# Patient Record
Sex: MALE | Race: WHITE
[De-identification: names, ages, dates, MRNs, and addresses within clinical notes are randomized per-mention and may not be internally consistent; named-entity substitution may affect disease eponyms.]

---

## 2017-03-11 ENCOUNTER — HOSPITAL ENCOUNTER (INPATIENT)
Dept: HOSPITAL 61 - ER | Age: 77
LOS: 2 days | Discharge: HOME | DRG: 392 | End: 2017-03-13
Attending: INTERNAL MEDICINE | Admitting: INTERNAL MEDICINE
Payer: MEDICARE

## 2017-03-11 VITALS — DIASTOLIC BLOOD PRESSURE: 84 MMHG | SYSTOLIC BLOOD PRESSURE: 143 MMHG

## 2017-03-11 VITALS — DIASTOLIC BLOOD PRESSURE: 83 MMHG | SYSTOLIC BLOOD PRESSURE: 134 MMHG

## 2017-03-11 VITALS — BODY MASS INDEX: 27.79 KG/M2 | HEIGHT: 67 IN | WEIGHT: 177.03 LBS

## 2017-03-11 DIAGNOSIS — E78.5: ICD-10-CM

## 2017-03-11 DIAGNOSIS — I12.9: ICD-10-CM

## 2017-03-11 DIAGNOSIS — Z88.5: ICD-10-CM

## 2017-03-11 DIAGNOSIS — E11.21: ICD-10-CM

## 2017-03-11 DIAGNOSIS — K21.9: ICD-10-CM

## 2017-03-11 DIAGNOSIS — Z79.82: ICD-10-CM

## 2017-03-11 DIAGNOSIS — Z82.49: ICD-10-CM

## 2017-03-11 DIAGNOSIS — E03.9: ICD-10-CM

## 2017-03-11 DIAGNOSIS — K57.90: ICD-10-CM

## 2017-03-11 DIAGNOSIS — N18.3: ICD-10-CM

## 2017-03-11 DIAGNOSIS — E11.22: ICD-10-CM

## 2017-03-11 DIAGNOSIS — Z79.84: ICD-10-CM

## 2017-03-11 DIAGNOSIS — Z83.3: ICD-10-CM

## 2017-03-11 DIAGNOSIS — R07.89: ICD-10-CM

## 2017-03-11 DIAGNOSIS — Z82.3: ICD-10-CM

## 2017-03-11 DIAGNOSIS — N40.0: ICD-10-CM

## 2017-03-11 DIAGNOSIS — E78.00: ICD-10-CM

## 2017-03-11 DIAGNOSIS — K22.4: Primary | ICD-10-CM

## 2017-03-11 DIAGNOSIS — Z79.4: ICD-10-CM

## 2017-03-11 DIAGNOSIS — Z97.0: ICD-10-CM

## 2017-03-11 LAB
ALBUMIN SERPL-MCNC: 4.2 G/DL (ref 3.4–5)
ALP SERPL-CCNC: 47 U/L (ref 46–116)
ALT SERPL-CCNC: 31 U/L (ref 16–63)
ANION GAP SERPL CALC-SCNC: 14 MMOL/L (ref 6–14)
AST SERPL-CCNC: 29 U/L (ref 15–37)
BASOPHILS # BLD AUTO: 0.1 X10^3/UL (ref 0–0.2)
BASOPHILS NFR BLD: 1 % (ref 0–3)
BILIRUB DIRECT SERPL-MCNC: 0.1 MG/DL (ref 0–0.2)
BILIRUB SERPL-MCNC: 0.6 MG/DL (ref 0.2–1)
BUN SERPL-MCNC: 9 MG/DL (ref 8–26)
CALCIUM SERPL-MCNC: 9.3 MG/DL (ref 8.5–10.1)
CHLORIDE SERPL-SCNC: 100 MMOL/L (ref 98–107)
CK SERPL-CCNC: 125 U/L (ref 39–308)
CKMB INDEX: 1.3 % (ref 0–4)
CKMB MASS: 1.6 NG/ML (ref 0–3.6)
CO2 SERPL-SCNC: 24 MMOL/L (ref 21–32)
CREAT SERPL-MCNC: 1.3 MG/DL (ref 0.7–1.3)
EOSINOPHIL NFR BLD: 4 % (ref 0–3)
ERYTHROCYTE [DISTWIDTH] IN BLOOD BY AUTOMATED COUNT: 12.9 % (ref 11.5–14.5)
GFR SERPLBLD BASED ON 1.73 SQ M-ARVRAT: 53.8 ML/MIN
GLUCOSE SERPL-MCNC: 140 MG/DL (ref 70–99)
HCT VFR BLD CALC: 47.7 % (ref 39–53)
HGB BLD-MCNC: 16.2 G/DL (ref 13–17.5)
INR PPP: 0.9 (ref 0.8–1.1)
LYMPHOCYTES # BLD: 3 X10^3/UL (ref 1–4.8)
LYMPHOCYTES NFR BLD AUTO: 32 % (ref 24–48)
MAGNESIUM SERPL-MCNC: 1.9 MG/DL (ref 1.8–2.4)
MCH RBC QN AUTO: 31 PG (ref 25–35)
MCHC RBC AUTO-ENTMCNC: 34 G/DL (ref 31–37)
MCV RBC AUTO: 92 FL (ref 79–100)
MONOCYTES NFR BLD: 7 % (ref 0–9)
NEUTROPHILS NFR BLD AUTO: 56 % (ref 31–73)
PLATELET # BLD AUTO: 306 X10^3/UL (ref 140–400)
POTASSIUM SERPL-SCNC: 3.9 MMOL/L (ref 3.5–5.1)
PROT SERPL-MCNC: 8.7 G/DL (ref 6.4–8.2)
PROTHROMBIN TIME: 12 SEC (ref 11.7–14)
RBC # BLD AUTO: 5.17 X10^6/UL (ref 4.3–5.7)
SODIUM SERPL-SCNC: 138 MMOL/L (ref 136–145)
WBC # BLD AUTO: 9.5 X10^3/UL (ref 4–11)

## 2017-03-11 PROCEDURE — 84484 ASSAY OF TROPONIN QUANT: CPT

## 2017-03-11 PROCEDURE — 93017 CV STRESS TEST TRACING ONLY: CPT

## 2017-03-11 PROCEDURE — 83036 HEMOGLOBIN GLYCOSYLATED A1C: CPT

## 2017-03-11 PROCEDURE — 82553 CREATINE MB FRACTION: CPT

## 2017-03-11 PROCEDURE — 83735 ASSAY OF MAGNESIUM: CPT

## 2017-03-11 PROCEDURE — 71010: CPT

## 2017-03-11 PROCEDURE — 96376 TX/PRO/DX INJ SAME DRUG ADON: CPT

## 2017-03-11 PROCEDURE — A9500 TC99M SESTAMIBI: HCPCS

## 2017-03-11 PROCEDURE — 78452 HT MUSCLE IMAGE SPECT MULT: CPT

## 2017-03-11 PROCEDURE — 85027 COMPLETE CBC AUTOMATED: CPT

## 2017-03-11 PROCEDURE — 93005 ELECTROCARDIOGRAM TRACING: CPT

## 2017-03-11 PROCEDURE — 80048 BASIC METABOLIC PNL TOTAL CA: CPT

## 2017-03-11 PROCEDURE — 80076 HEPATIC FUNCTION PANEL: CPT

## 2017-03-11 PROCEDURE — 93306 TTE W/DOPPLER COMPLETE: CPT

## 2017-03-11 PROCEDURE — 82947 ASSAY GLUCOSE BLOOD QUANT: CPT

## 2017-03-11 PROCEDURE — 80061 LIPID PANEL: CPT

## 2017-03-11 PROCEDURE — 36415 COLL VENOUS BLD VENIPUNCTURE: CPT

## 2017-03-11 PROCEDURE — 85610 PROTHROMBIN TIME: CPT

## 2017-03-11 PROCEDURE — 83880 ASSAY OF NATRIURETIC PEPTIDE: CPT

## 2017-03-11 PROCEDURE — 96374 THER/PROPH/DIAG INJ IV PUSH: CPT

## 2017-03-11 RX ADMIN — ATORVASTATIN CALCIUM SCH MG: 20 TABLET, FILM COATED ORAL at 20:38

## 2017-03-11 RX ADMIN — INSULIN DETEMIR SCH UNITS: 100 INJECTION, SOLUTION SUBCUTANEOUS at 20:42

## 2017-03-11 NOTE — PHYS DOC
Past Medical History


Past Medical History:  Diabetes-Type II, High Cholesterol, Hypertension


Past Surgical History:  Other


Additional Past Surgical Histo:  hernia repair


Alcohol Use:  None


Drug Use:  None





Adult General


Chief Complaint


Chief Complaint:  CHEST PAIN





HPI


HPI


Patient is a 77  year old female brought to the ED by ambulance with the 

complaint of pressure across the middle of his chest for about one hour. He 

never had chest pressure like this before. He did have an episode a few days 

ago of some pressure type pain in his left arm which went away by itself. He 

wasn't doing anything in particular. He did have some associated shortness of 

air today. When paramedics arrived they gave him a sublingual nitroglycerin and 

on arrival to the ED his pain is gone. He has also had a full dose of aspirin 

prehospital.





PatienT has no cardiac history.





PCP Dr. Edgar





Review of Systems


Review of Systems





Constitutional: Denies fever or chills []


Eyes: Denies change in visual acuity, redness, or eye pain []


HENT: Denies nasal congestion or sore throat []


Respiratory: Denies cough or shortness of breath []


Cardiovascular: As in history of present illness


GI: Denies abdominal pain, nausea, vomiting, bloody stools or diarrhea []


: Denies dysuria or hematuria []


Musculoskeletal: Denies back pain or joint pain []


Integument: Denies rash or skin lesions []


Neurologic: Denies headache, focal weakness or sensory changes []





Current Medications


Current Medications





 Current Medications








 Medications


  (Trade)  Dose


 Ordered  Sig/Quique  Start Time


 Stop Time Status Last Admin


Dose Admin


 


 Nitroglycerin


  (Nitrostat)  0.4 mg  PRN Q5MIN  PRN  3/11/17 19:00


 3/12/17 18:59   


 











Allergies


Allergies





 Allergies








Coded Allergies Type Severity Reaction Last Updated Verified


 


  morphine Allergy Severe stops breathing 12/29/14 No











Physical Exam


Physical Exam





Constitutional: Well developed, well nourished, no acute distress, non-toxic 

appearance. Alert, mentating normally.


HENT: Normocephalic, atraumatic, bilateral external ears normal,  nose normal. [

]


Eyes: conjunctiva normal, no discharge. [] 


Neck: Normal range of motion, no stridor. [] 


Cardiovascular:Heart rate regular rhythm, no murmur []


Lungs & Thorax:  Bilateral breath sounds clear to auscultation []


Abdomen: Bowel sounds normal, soft, no tenderness, no masses, no pulsatile 

masses. [] 


Skin: Warm, dry, no erythema, no rash. [] 


Back: No tenderness, no CVA tenderness. [] 


Extremities: No tenderness, no cyanosis, no clubbing, ROM intact, no edema. [] 


Neurologic: Alert and oriented X 3, normal motor function, normal sensory 

function, no focal deficits noted. []





Current Patient Data


Vital Signs





 Vital Signs








  Date Time  Temp Pulse Resp B/P Pulse Ox O2 Delivery O2 Flow Rate FiO2


 


3/11/17 16:56 98.5 76 22 146/74 97 Room Air  





 98.5       








Lab Values





 Laboratory Tests








Test


  3/11/17


17:10


 


White Blood Count


  9.5x10^3/uL


(4.0-11.0)


 


Red Blood Count


  5.17x10^6/uL


(4.30-5.70)


 


Hemoglobin


  16.2g/dL


(13.0-17.5)


 


Hematocrit


  47.7%


(39.0-53.0)


 


Mean Corpuscular Volume 92fL ()  


 


Mean Corpuscular Hemoglobin 31pg (25-35)  


 


Mean Corpuscular Hemoglobin


Concent 34g/dL (31-37)


 


 


Red Cell Distribution Width


  12.9%


(11.5-14.5)


 


Platelet Count


  306x10^3/uL


(140-400)


 


Neutrophils (%) (Auto) 56% (31-73)  


 


Lymphocytes (%) (Auto) 32% (24-48)  


 


Monocytes (%) (Auto) 7% (0-9)  


 


Eosinophils (%) (Auto) 4% (0-3)  H


 


Basophils (%) (Auto) 1% (0-3)  


 


Neutrophils # (Auto)


  5.3x10^3uL


(1.8-7.7)


 


Lymphocytes # (Auto)


  3.0x10^3/uL


(1.0-4.8)


 


Monocytes # (Auto)


  0.7x10^3/uL


(0.0-1.1)


 


Eosinophils # (Auto)


  0.4x10^3/uL


(0.0-0.7)


 


Basophils # (Auto)


  0.1x10^3/uL


(0.0-0.2)


 


Prothrombin Time


  12.0SEC


(11.7-14.0)


 


Prothrombin Time INR 0.9 (0.8-1.1)  


 


Sodium Level


  138mmol/L


(136-145)


 


Potassium Level


  3.9mmol/L


(3.5-5.1)


 


Chloride Level


  100mmol/L


()


 


Carbon Dioxide Level


  24mmol/L


(21-32)


 


Anion Gap 14 (6-14)  


 


Blood Urea Nitrogen 9mg/dL (8-26)  


 


Creatinine


  1.3mg/dL


(0.7-1.3)


 


Estimated GFR


(Cockcroft-Gault) 53.8  


 


 


Glucose Level


  140mg/dL


(70-99)  H


 


Calcium Level


  9.3mg/dL


(8.5-10.1)


 


Magnesium Level


  1.9mg/dL


(1.8-2.4)


 


Total Bilirubin


  0.6mg/dL


(0.2-1.0)


 


Direct Bilirubin


  0.1mg/dL


(0.0-0.2)


 


Aspartate Amino Transferase


(AST) 29U/L (15-37)  


 


 


Alanine Aminotransferase (ALT) 31U/L (16-63)  


 


Alkaline Phosphatase


  47U/L ()


 


 


Creatine Kinase


  125U/L


()


 


Creatine Kinase MB (Mass)


  1.6ng/mL


(0.0-3.6)


 


Creatine Kinase MB Relative


Index 1.3% (0-4)  


 


 


Troponin I Quantitative


  < 0.017ng/mL


(0.000-0.055)


 


NT-Pro-B-Type Natriuretic


Peptide 93pg/mL


(0-449)


 


Total Protein


  8.7g/dL


(6.4-8.2)  H


 


Albumin


  4.2g/dL


(3.4-5.0)





 Laboratory Tests


3/11/17 17:10








 Laboratory Tests


3/11/17 17:10














EKG


EKG


12-lead EKG read by me. Sinus rhythm. Heart rate 100. There are 4 PVCs. There 

are no acute ST or T wave changes indicative of ischemia or infarction. No 

STEMI. 1659 []





Radiology/Procedures


Radiology/Procedures


One view portable chest x-ray read by me. Heart size is normal. Lung fields are 

clear. No acute cardiopulmonary abnormality. []





Course & Med Decision Making


Course & Med Decision Making


Pertinent Labs and Imaging studies reviewed. (See chart for details)





77-year-old male presents by EMS with a one-hour history of a pressure-type 

discomfort in his chest that is new for him. No EKG changes. It was relieved by 

one sublingual nitroglycerin per EMS. He has had a dose of aspirin. I'm 

concerned that this may represent angina/cardiac chest pain. I discussed with 

the patient admission to the hospital for serial troponins and cardiology 

consultation for further evaluation, he is agreeable to that plan.





I spoke with Dr. Edgar about admitting the patient. I wrote bridge orders.





[]





Dragon Disclaimer


Dragon Disclaimer


This electronic medical record was generated, in whole or in part, using a 

voice recognition dictation system.





Departure


Departure


Impression:  


 Primary Impression:  


 Chest pain


Disposition:  09 ADMITTED AS INPATIENT


Admitting Physician:  Quentin Edgar


Condition:  STABLE


Referrals:  


QUENTIN EDGAR MD (PCP)








ZHENG MARIE MD Mar 11, 2017 18:41

## 2017-03-12 VITALS — SYSTOLIC BLOOD PRESSURE: 134 MMHG | DIASTOLIC BLOOD PRESSURE: 77 MMHG

## 2017-03-12 VITALS — DIASTOLIC BLOOD PRESSURE: 77 MMHG | SYSTOLIC BLOOD PRESSURE: 125 MMHG

## 2017-03-12 VITALS — DIASTOLIC BLOOD PRESSURE: 76 MMHG | SYSTOLIC BLOOD PRESSURE: 127 MMHG

## 2017-03-12 VITALS — SYSTOLIC BLOOD PRESSURE: 138 MMHG | DIASTOLIC BLOOD PRESSURE: 80 MMHG

## 2017-03-12 VITALS — DIASTOLIC BLOOD PRESSURE: 76 MMHG | SYSTOLIC BLOOD PRESSURE: 129 MMHG

## 2017-03-12 VITALS — DIASTOLIC BLOOD PRESSURE: 58 MMHG | SYSTOLIC BLOOD PRESSURE: 90 MMHG

## 2017-03-12 LAB
CHOLEST SERPL-MCNC: 133 MG/DL (ref 0–200)
CHOLEST/HDLC SERPL: 4.3 {RATIO}
HDLC SERPL-MCNC: 31 MG/DL (ref 40–60)
TRIGL SERPL-MCNC: 231 MG/DL (ref 0–150)

## 2017-03-12 RX ADMIN — INSULIN ASPART SCH UNITS: 100 INJECTION, SOLUTION INTRAVENOUS; SUBCUTANEOUS at 12:17

## 2017-03-12 RX ADMIN — METFORMIN HYDROCHLORIDE SCH MG: 1000 TABLET, FILM COATED ORAL at 08:26

## 2017-03-12 RX ADMIN — METFORMIN HYDROCHLORIDE SCH MG: 1000 TABLET, FILM COATED ORAL at 17:37

## 2017-03-12 RX ADMIN — ATORVASTATIN CALCIUM SCH MG: 20 TABLET, FILM COATED ORAL at 20:40

## 2017-03-12 RX ADMIN — ASPIRIN 325 MG ORAL TABLET SCH MG: 325 PILL ORAL at 12:15

## 2017-03-12 RX ADMIN — AMLODIPINE BESYLATE SCH MG: 5 TABLET ORAL at 08:26

## 2017-03-12 RX ADMIN — INSULIN DETEMIR SCH UNITS: 100 INJECTION, SOLUTION SUBCUTANEOUS at 20:44

## 2017-03-12 RX ADMIN — LEVOTHYROXINE SODIUM SCH MCG: 88 TABLET ORAL at 06:01

## 2017-03-12 RX ADMIN — INSULIN ASPART SCH UNITS: 100 INJECTION, SOLUTION INTRAVENOUS; SUBCUTANEOUS at 08:31

## 2017-03-12 RX ADMIN — LISINOPRIL SCH MG: 20 TABLET ORAL at 08:27

## 2017-03-12 NOTE — RAD
EXAM: Chest one view.



HISTORY: Chest pain.



COMPARISON: 12/29/2014.



FINDINGS: A frontal view of the chest is obtained.    



Linear opacities in the left base are consistent with atelectasis or scarring.

A triangular opacity in the right cardiophrenic angle is stable and likely

represents a fat pad. Calcified mediastinal lymph nodes are likely secondary

to old granulomatous disease. There is no pneumothorax or pleural effusion.

The heart is not enlarged. There are atherosclerotic calcifications of the

aorta. A paraspinal opacity at the hiatus suggests a hiatal hernia or aortic

tortuosity. It is stable.



IMPRESSION: 

1. Left basilar atelectasis or scarring.

2. Moderate hiatal hernia versus aortic tortuosity.

## 2017-03-12 NOTE — ACF
Admission Forms Criteria


                                                CHEST PAIN





Clinical Indications for Admission to Inpatient Care





                                                                         (Place 

'X' for any and all applicable criteria): 





Admission is indicated for chest pain and ANY ONE of the following(1)(2)(3)(4)(5

): 





[ ]I.    Angina with acute coronary syndrome (Also use Myocardial Infarction or 

Angina guideline)


[ ]II.   Hemodynamic instability


[ ]III.  Angina needing acute intervention as indicated by ALL of the following(

11)(12):


        [ ]a)  Unstable angina is present as indicated by angina that is ANY ONE

 of the following:


                [ ]i)     New onset   


                [ ]ii)    Nocturnal   


                [ ]iii)   Prolonged at rest   


                [ ]iv)   Progressive


        [ ]b)  Angina warrants acute intervention as indicated by ANY ONE of 

the following:


                [ ]i)     Recurrent angina (e.g, not responding as previously 

to treatment)


                [ ]ii)     Angina at rest or with low-level activities despite 

initial medical therapy


                [ ]iii)    New or presumably new ST-segment depression on ECG


                [ ]iv)    Signs or symptoms of heart failure (eg, dyspnea, 

pulmonary edema)


                [ ]v)     New or worsening mitral regurgitation


                [ ]vi)    Hemodynamic instability


                [ ]vii)   Dangerous arrhythmia (eg, sustained ventricular 

tachycardia)          


                [ ]viii)   History of percutaneous coronary intervention within 

6 months          


                [ ]ix)    History of coronary artery bypass graft surgery


                [ ]x)    MIRIAM risk score of 2 or greater[A]


                [ ]xi)    History of Diabetes(14)


                [ ]xii)   High-risk cardiac ischemia findings on noninvasive 

testing (e.g, echocardiogram,


                          treadmill testing, nuclear scan)


                [ ]xiii)  Chronic renal insufficiency (ie, estimated GFR less 

than 60 mL/min/1.732m)


                [ ]xiv)  Left ventricular ejection fraction less than 40%


              


[ ]IV.   Evidence of MI (eg, cardiac biomarkers positive, ST-segment elevation 

on ECG) also use Myocardial Infarction Criteria Form.


[ ]V.    Pulmonary edema 


[ ]VI.   Respiratory distress


[ ]VII.  Chest pain indicative of serious diagnosis other than coronary artery 

disease (eg, aortic dissection)





[ ]VIII. Contraindications and/or Inappropriate clinical situations for 

Observational Care in patients


          with Chest Pain, when ANY ONE of the following is required:


          [ ]a)   Patient with risk factor for pulmonary embolism, acute 

coronary syndrome and myocardial infarction (18)


          [ ]b)   Patient with Pulmonary embolism require an average LOS of 4.3 

days, therefore emergency 


                   department observation management is inappropriate 18,23


          [ ]c)   Painful condition/s in the elderly, have the highest rate of 

recidivism after emergency department observation management (10.8%)  20,21,22


          [ ]d)   Elevated cardiac biomarker requires intensive and exhaustive 

care (19)


[X]IX.  General contraindications and/or Inappropriate clinical situations for 

Observational Care in patients


          with Chest Pain, when ANY ONE of the following is required:


           [X]a)   Prediction of prolongation of LOS based on ANY ONE of the 

following may be considered as 


                    a contraindication for observational care 2, 3, 4, 5, 6, 7, 

8, 9, 10, 11


                    [X]i)    Age > 65 yrs.


                    [ ]ii)   Patient arriving by ambulance


                    [ ]iii)  Patient with high acuity


                    [X]iv)  Patient requiring vital sign monitoring


                    [ ]v)   Patient on IV medication


           [ ]b)   Systolic blood pressures  180mmHg  3,12


           [ ]c)   Patient with altered mental status including delirium and 

other alteration of consciousness, (3)


           [ ]d)   Patient whose discharge disposition will be to a skilled 

nursing home or rehabilitation home should 


                    not be managed in Emergency Department Observation Unit. 

CMS rule requires 3 days hospital stay before such placement. 3,13


           [ ]e)   Patient with failure to thrive due to broad array of 

etiologies  3,16,17


           [ ]f)    Inability to ambulate  3,14








Extended stay beyond goal length of stay may be needed for (1)(28):


[ ]a)   Specific condition diagnosed after evaluation (eg, pulmonary embolism, 

aortic dissection) 


[ ]b)   Unstable angina


[ ]c)   Continued suspicion of acute coronary syndrome with inability to 

complete needed cardiac evaluation


         (eg, patient clinically unable to undergo stress testing)


[ ]d)   Myocardial infarction








(Contents from ANGINA and CHEST PAIN clinical indications for admission to 

inpatient care have been integrated in this form) 








The original [facility] content created by [employer] has been revised. 


The portions of the content which have been revised are identified through the 

use of italic text or in bold, and [facility]


has neither reviewed nor approved the modified material. All other unmodified 

content is copyright  [facility].





Please see references footnoted in the original [facility] [employer] edition 

2016


Admission Criteria Met?:  Yes








VAIBHAV BRONSON Mar 12, 2017 05:35

## 2017-03-12 NOTE — PDOC
Provider Note


Provider Note


history and physical dictated  #  [id number]








MARLA CANTU MD Mar 12, 2017 09:57

## 2017-03-12 NOTE — PDOC2
CARDIAC CONSULT


DATE OF CONSULT


Date of Consult


DATE: 3/12/17 


TIME: 16:21





REASON FOR CONSULT


Reason for Consult:


Chest pain





HISTORY OF PRESENT ILLNESS


HISTORY OF PRESENT ILLNESS


76 yo diabetic reports left arm pain last week that was unprovoked and subsided 

spontaneously after 15 to 20 minutes. Then had substernal pressure on day of 

admit that again was unprovoked and lasted 15-20 minutes and subsided. The 

patient then began to unload the , bending often at the waist, when 

he had resumption of the SSCP, worse in intensity. This lasted about an hour 

before being relieved in the ED with some nitro. He has been symptom free since 

admit. Troponin negative x 2.





PAST MEDICAL HISTORY


Cardiovascular:  HTN, Hyperlipidemia


Endocrine:  Diabetes





PAST SURGICAL HISTORY


Past Surgical History


Hernia x 2





FAMILY HISTORY


Family History


Ftaher with CHF late in life





SOCIAL HISTORY


Smoke:  No





CURRENT MEDICATIONS


CURRENT MEDICATIONS





Current Medications








 Medications


  (Trade)  Dose


 Ordered  Sig/Quique


 Route


 PRN Reason  Start Time


 Stop Time Status Last Admin


Dose Admin


 


 Amlodipine


 Besylate


  (Norvasc)  5 mg  DAILY


 PO


   3/12/17 09:00


    3/12/17 08:26


 


 


 Lisinopril


  (Prinivil)  20 mg  DAILY


 PO


   3/12/17 09:00


    3/12/17 08:27


 


 


 Insulin Aspart


  (Novolog)  6 units  DAILYWBKFT


 SQ


   3/12/17 08:00


    3/12/17 08:31


 


 


 Insulin Aspart


  (Novolog)  4 units  DAILYWLUN


 SQ


   3/12/17 12:00


    3/12/17 12:17


 


 


 Levothyroxine


 Sodium


  (Synthroid)  88 mcg  DAILY07


 PO


   3/12/17 07:00


    3/12/17 06:01


 


 


 Insulin Detemir


  (Levemir)  22 units  QHS


 SQ


   3/11/17 21:00


    3/11/17 20:42


 


 


 Metformin HCl


  (Glucophage)  1,000 mg  BIDWMEALS


 PO


   3/12/17 08:00


    3/12/17 08:26


 


 


 Atorvastatin


 Calcium


  (Lipitor)  20 mg  QHS


 PO


   3/11/17 21:00


    3/11/17 20:38


 


 


 Aspirin


  (Edie Aspirin)  325 mg  DAILYWBKFT


 PO


   3/12/17 11:00


    3/12/17 12:15


 











ALLERGIES


ALLERGIES:  


Coded Allergies:  


     morphine (Verified  Allergy, Severe, stops breathing, 3/12/17)





PHYSICAL EXAM


General:  Alert, Oriented X3, Cooperative


Lungs:  Clear to auscultation


Heart:  No murmurs


Abdomen:  Normal bowel sounds


Extremities:  No edema


Neuro:  Other (non-focal)





VITALS


VITALS





 Vital Signs








  Date Time  Temp Pulse Resp B/P Pulse Ox O2 Delivery O2 Flow Rate FiO2


 


3/12/17 14:30 97.9 76 18 125/77 97 Room Air  





 97.9       


 


3/11/17 22:59       2.0 











LABS


Lab:





Laboratory Tests








Test


  3/11/17


17:10 3/11/17


20:01 3/12/17


01:00 3/12/17


05:55


 


White Blood Count


  9.5x10^3/uL


(4.0-11.0) 


  


  


 


 


Red Blood Count


  5.17x10^6/uL


(4.30-5.70) 


  


  


 


 


Hemoglobin


  16.2g/dL


(13.0-17.5) 


  


  


 


 


Hematocrit


  47.7%


(39.0-53.0) 


  


  


 


 


Mean Corpuscular Volume 92fL ()    


 


Mean Corpuscular Hemoglobin 31pg (25-35)    


 


Mean Corpuscular Hemoglobin


Concent 34g/dL (31-37) 


  


  


  


 


 


Red Cell Distribution Width


  12.9%


(11.5-14.5) 


  


  


 


 


Platelet Count


  306x10^3/uL


(140-400) 


  


  


 


 


Neutrophils (%) (Auto) 56% (31-73)    


 


Lymphocytes (%) (Auto) 32% (24-48)    


 


Monocytes (%) (Auto) 7% (0-9)    


 


Eosinophils (%) (Auto) 4% (0-3)    


 


Basophils (%) (Auto) 1% (0-3)    


 


Neutrophils # (Auto)


  5.3x10^3uL


(1.8-7.7) 


  


  


 


 


Lymphocytes # (Auto)


  3.0x10^3/uL


(1.0-4.8) 


  


  


 


 


Monocytes # (Auto)


  0.7x10^3/uL


(0.0-1.1) 


  


  


 


 


Eosinophils # (Auto)


  0.4x10^3/uL


(0.0-0.7) 


  


  


 


 


Basophils # (Auto)


  0.1x10^3/uL


(0.0-0.2) 


  


  


 


 


Prothrombin Time


  12.0SEC


(11.7-14.0) 


  


  


 


 


Prothromb Time International


Ratio 0.9 (0.8-1.1) 


  


  


  


 


 


Sodium Level


  138mmol/L


(136-145) 


  


  


 


 


Potassium Level


  3.9mmol/L


(3.5-5.1) 


  


  


 


 


Chloride Level


  100mmol/L


() 


  


  


 


 


Carbon Dioxide Level


  24mmol/L


(21-32) 


  


  


 


 


Anion Gap 14 (6-14)    


 


Blood Urea Nitrogen 9mg/dL (8-26)    


 


Creatinine


  1.3mg/dL


(0.7-1.3) 


  


  


 


 


Estimated GFR


(Cockcroft-Gault) 53.8 


  


  


  


 


 


Glucose Level


  140mg/dL


(70-99) 


  


  


 


 


Calcium Level


  9.3mg/dL


(8.5-10.1) 


  


  


 


 


Magnesium Level


  1.9mg/dL


(1.8-2.4) 


  


  


 


 


Total Bilirubin


  0.6mg/dL


(0.2-1.0) 


  


  


 


 


Direct Bilirubin


  0.1mg/dL


(0.0-0.2) 


  


  


 


 


Aspartate Amino Transf


(AST/SGOT) 29U/L (15-37) 


  


  


  


 


 


Alanine Aminotransferase


(ALT/SGPT) 31U/L (16-63) 


  


  


  


 


 


Alkaline Phosphatase 47U/L ()    


 


Creatine Kinase


  125U/L


() 


  


  


 


 


Creatine Kinase MB (Mass)


  1.6ng/mL


(0.0-3.6) 


  


  


 


 


Creatine Kinase MB Relative


Index 1.3% (0-4) 


  


  


  


 


 


Troponin I Quantitative


  < 0.017ng/mL


(0.000-0.055) 


  < 0.017ng/mL


(0.000-0.055) < 0.017ng/mL


(0.000-0.055)


 


NT-Pro-B-Type Natriuretic


Peptide 93pg/mL


(0-449) 


  


  


 


 


Total Protein


  8.7g/dL


(6.4-8.2) 


  


  


 


 


Albumin


  4.2g/dL


(3.4-5.0) 


  


  


 


 


Glucose (Fingerstick)


  


  216mg/dL


(70-99) 


  


 


 


Triglycerides Level


  


  


  


  231mg/dL


(0-150)


 


Cholesterol Level


  


  


  


  133mg/dL


(0-200)


 


LDL Cholesterol, Calculated


  


  


  


  56mg/dL


(0-100)


 


VLDL Cholesterol, Calculated    46mg/dL (0-40) 


 


HDL Cholesterol


  


  


  


  31mg/dL


(40-60)


 


Cholesterol/HDL Ratio    4.3 














Test


  3/12/17


07:15 3/12/17


11:25 


  


 


 


Glucose (Fingerstick)


  153mg/dL


(70-99) 182mg/dL


(70-99) 


  


 











EKG


EKG


No injury pattern or ischemia





ASSESSMENT/PLAN


ASSESSMENT/PLAN


1) Non-cardiac chest pain. Most likely GI, particularly given that he was 

bending at the waist when worst symptoms were provoked, c/w possible hiatal 

hernia or esophageal spasm.


2) Diabetes


3) HTN





This event is without any high risk features, and therefore there is no need 

for ACS treatment with anticoagulation or DAPT. However, it is reasonable to 

perform risk stratification with stress testing given his risk profile with DM, 

HTN,and age. Plan for tomorrow. More care planning and recommendations to 

follow stress testing.





Total time with more than half counseling and coordinating care on the unit is 

35 minutes.


Problems:  








LATOYA COATES DO Mar 12, 2017 16:30

## 2017-03-12 NOTE — HP
ADMIT DATE:  03/11/2017



LOCATION:  He is in room 504.



HISTORY OF PRESENT ILLNESS:  The patient is a 77-year-old white male with a

history of diabetes mellitus type 2, hypertension, hyperlipidemia and

hypothyroidism who was admitted to [facility] at the Emergency

Room with chest pressure.  The patient states that he was sitting down at the

computer reviewing his emails when he developed the onset of chest pressure

associated with dizziness and generalized weakness, but without any shortness of

breath or diaphoresis.  He took an aspirin 81 mg and paramedics arrived and he

noted his chest pressure was improving.  He was given a sublingual nitroglycerin

and chest pressure was essentially resolved by the time he got to the Emergency

Room.  His electrocardiogram was told to not show any acute change.  He also had

a chest x-ray done.  He has not had any recurrent chest pressure since

admission.  He did note 2 days prior to his episode yesterday, he had a 4-minute

episode of left arm discomfort, but attributed that to lifting some heavy things

prior to that.  Denies any exertional chest discomfort.  He does not have any

family history of acute myocardial infarctions, nor does he smoke cigarettes. He

said he was taken off of aspirin in 2014 because he had a lower GI bleed.



ALLERGIES AND INTOLERANCES:  MORPHINE.



MEDICATIONS:  Prior to admission include Levemir insulin 22 units at bedtime. 

He is on NovoLog insulin 6 units before breakfast and 4 units before lunch and

supper.  He takes amlodipine/benazepril 5/10 mg once a day and actual benazepril

10 mg every day.  Also takes levothyroxine 88 mcg every day, metformin 1000 mg

b.i.d., and lovastatin 40 mg every day.



PAST MEDICAL HISTORY:  Significant for diabetes mellitus type 2, hypertension,

hyperlipidemia and hypothyroidism.  He has a history of benign prostatic

hypertrophy.  He has right eye prosthesis, right inguinal hernia repair in 2008,

umbilical hernia repair, and tonsillectomy.  He had a colonoscopy with

polypectomy with tubular adenomas removed in 01/2013.  History of

diverticulosis.  Apparently, he had a lower GI bleed in 2013.



SOCIAL HISTORY:  He does not drink alcohol nor does she smoke cigarettes.  He is

retired and .  He lives alone.



FAMILY HISTORY:  Mother with diabetes mellitus and cerebrovascular accident. 

His sister has diabetes mellitus.



REVIEW OF SYSTEMS:

GENERAL:  There has been no fever, chills or sweats in the last few days.

CARDIOVASCULAR:  He had chest pressure.

PULMONARY:  No cough or shortness of breath.

GASTROINTESTINAL:  No constipation.

SKIN:  No rashes.

NEUROLOGIC:  No focal weakness.

ENDOCRINE:  He has diabetes mellitus.

SKIN:  No rashes.



The rest of systems reviewed are negative except as stated in the history of

present illness.



PHYSICAL EXAMINATION:

VITAL SIGNS:  Temperature is 97.7 degrees, apical pulse is regular at 68,

respiratory rate 18, blood pressure 129/76, and oxygen saturation 97% on room

air.

HEENT:  Eyes:  He has got a prosthetic right eye.  Mouth is symmetrical.

NECK:  No cervical lymphadenopathy or thyroid enlargement.

HEART:  Reveals an S1, S2.  There is no S3 or murmur.

LUNGS:  Clear.

ABDOMEN:  Soft with no hepatosplenomegaly, masses, or tenderness.

EXTREMITIES:  Lower extremities without edema.  Examination of his feet,

dorsalis pedis pulses are present bilaterally.

SKIN:  No rashes.

NEUROLOGIC:  Revealed no focal weakness of the extremities or facial asymmetry.



LABORATORY DATA:  White count 9.5, hemoglobin 16.2, platelet count 306,000, 56

polys and 32 lymphocytes.  INR was 0.9.  Serum sodium 138, potassium 3.9,

chloride 100, total CO2 is 24, BUN 9, creatinine 1.3, estimated GFR 53.8.  Liver

function tests were normal.  Albumin 4.2.  Troponin was negative x 3. 

Fingerstick blood sugar 153 today, cholesterol 133, LDL cholesterol 56, HDL

cholesterol 31, and triglycerides 231.  The chest x-ray report is pending and I

do not have access to read the electrocardiogram due to the computer issue we

have at this facility.



ASSESSMENT AND PLAN:

1.  Chest pressure.  He did not have an acute myocardial infarction, as his

cardiac enzymes are negative x 3; however, he does have several risk factors for

coronary artery disease including diabetes mellitus, hypertension and

hyperlipidemia.  We will order an echocardiogram and a stress test and consult

with cardiology.

2.  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

3.  Chronic kidney disease stage 3.

4.  Hypertension.

5.  Hyperlipidemia.

6.  Hypothyroidism.

7.  Diverticulosis.



PLAN:  Plan at this time is to resume his aspirin 325 mg every day.  I will

order an echocardiogram and have the cardiologist order the stress test for

tomorrow.  We will continue with his current insulin dose, continue with his

amlodipine and substitute his benazepril with lisinopril and we will also

substitute his lovastatin with atorvastatin.  Continue with the aspirin for now.

 Continue with the metformin also and insulin for his diabetes mellitus and the

levothyroxine for his hypothyroidism.  Hemoglobin A1c was ordered. 

Nitroglycerin sublingual p.r.n. has been ordered.  We will put him on ADA

cardiac diet and consult Dr. Reyez for cardiology.

 



______________________________

MARLA CANTU MD



DR:  MELISSA/nts  JOB#:  [id number] / [id number]

DD:  03/12/2017 09:56  DT:  03/12/2017 12:02

## 2017-03-12 NOTE — EKG
[facility]

              [address] [place] [address]

Test Date:    2017               Test Time:    16:59:53

Pat Name:     LAINE DE LA PAZ              Department:   

Patient ID:   [id number]           Room:          

Gender:       M                        Technician:   

:          [id number]               Requested By: ZHENG MARIE

Order Number: [id number]            Reading MD:     

                                 Measurements

Intervals                              Axis          

Rate:         100                      P:            34

CT:           154                      QRS:          4

QRSD:         84                       T:            28

QT:           338                                    

QTc:          439                                    

                           Interpretive Statements

SINUS RHYTHM

VENTRICULAR PREMATURE COMPLEX(ES)

QRS(T) CONTOUR ABNORMALITY

CONSIDER ANTEROLATERAL MYOCARDIAL DAMAGE

CONSIDER INFERIOR MYOCARDIAL DAMAGE

RI6.01          Unconfirmed report

No previous ECG available for comparison

## 2017-03-13 VITALS
SYSTOLIC BLOOD PRESSURE: 105 MMHG | DIASTOLIC BLOOD PRESSURE: 65 MMHG | SYSTOLIC BLOOD PRESSURE: 105 MMHG | DIASTOLIC BLOOD PRESSURE: 65 MMHG

## 2017-03-13 VITALS — SYSTOLIC BLOOD PRESSURE: 131 MMHG | DIASTOLIC BLOOD PRESSURE: 80 MMHG

## 2017-03-13 LAB
ANION GAP SERPL CALC-SCNC: 8 MMOL/L (ref 6–14)
BASOPHILS # BLD AUTO: 0.1 X10^3/UL (ref 0–0.2)
BASOPHILS NFR BLD: 2 % (ref 0–3)
BUN SERPL-MCNC: 14 MG/DL (ref 8–26)
CALCIUM SERPL-MCNC: 8.7 MG/DL (ref 8.5–10.1)
CHLORIDE SERPL-SCNC: 107 MMOL/L (ref 98–107)
CO2 SERPL-SCNC: 27 MMOL/L (ref 21–32)
CREAT SERPL-MCNC: 1.2 MG/DL (ref 0.7–1.3)
EOSINOPHIL NFR BLD: 6 % (ref 0–3)
ERYTHROCYTE [DISTWIDTH] IN BLOOD BY AUTOMATED COUNT: 12.8 % (ref 11.5–14.5)
GFR SERPLBLD BASED ON 1.73 SQ M-ARVRAT: 59 ML/MIN
GLUCOSE SERPL-MCNC: 120 MG/DL (ref 70–99)
HCT VFR BLD CALC: 42.3 % (ref 39–53)
HGB BLD-MCNC: 14.3 G/DL (ref 13–17.5)
LYMPHOCYTES # BLD: 2.6 X10^3/UL (ref 1–4.8)
LYMPHOCYTES NFR BLD AUTO: 30 % (ref 24–48)
MCH RBC QN AUTO: 31 PG (ref 25–35)
MCHC RBC AUTO-ENTMCNC: 34 G/DL (ref 31–37)
MCV RBC AUTO: 92 FL (ref 79–100)
MONOCYTES NFR BLD: 9 % (ref 0–9)
NEUTROPHILS NFR BLD AUTO: 53 % (ref 31–73)
PLATELET # BLD AUTO: 240 X10^3/UL (ref 140–400)
POTASSIUM SERPL-SCNC: 4.5 MMOL/L (ref 3.5–5.1)
RBC # BLD AUTO: 4.61 X10^6/UL (ref 4.3–5.7)
SODIUM SERPL-SCNC: 142 MMOL/L (ref 136–145)
WBC # BLD AUTO: 8.4 X10^3/UL (ref 4–11)

## 2017-03-13 RX ADMIN — LEVOTHYROXINE SODIUM SCH MCG: 88 TABLET ORAL at 06:28

## 2017-03-13 RX ADMIN — METFORMIN HYDROCHLORIDE SCH MG: 1000 TABLET, FILM COATED ORAL at 12:17

## 2017-03-13 RX ADMIN — INSULIN ASPART SCH UNITS: 100 INJECTION, SOLUTION INTRAVENOUS; SUBCUTANEOUS at 08:00

## 2017-03-13 RX ADMIN — LISINOPRIL SCH MG: 20 TABLET ORAL at 12:19

## 2017-03-13 RX ADMIN — INSULIN ASPART SCH UNITS: 100 INJECTION, SOLUTION INTRAVENOUS; SUBCUTANEOUS at 12:23

## 2017-03-13 RX ADMIN — ASPIRIN 325 MG ORAL TABLET SCH MG: 325 PILL ORAL at 12:17

## 2017-03-13 RX ADMIN — AMLODIPINE BESYLATE SCH MG: 5 TABLET ORAL at 12:18

## 2017-03-13 NOTE — PDOC
PROGRESS NOTES


Subjective


Subjective


denies further chest pain. will have stress test today





Objective


Objective





 Vital Signs








  Date Time  Temp Pulse Resp B/P Pulse Ox O2 Delivery O2 Flow Rate FiO2


 


3/13/17 07:00 98.1 72 14 131/80 97 Room Air  





 98.1       


 


3/11/17 22:59       2.0 














 Intake and Output 


 


 3/13/17





 07:00


 


Intake Total 980 ml


 


Balance 980 ml


 


 


 


Intake Oral 980 ml


 


# Voids 6











Physical Exam


Abdomen:  Soft


Heart:  Regular rate, Normal S1, Normal S2


Extremities:  No edema


General:  Alert


HEENT:  Atraumatic


Lungs:  Clear to auscultation


Neuro:  Normal speech


Psych/Mental Status:  Mental status NL


Skin:  No rashes





Assessment


Assessment


 Problems


Medical Problems:1.  Chest pressure. troponin neg times 3


with cardiology.


2.  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


3.  Chronic kidney disease stage 3.


4.  Hypertension.


5.  Hyperlipidemia.


6.  Hypothyroidism.


7.  Diverticulosis.


(1) Chest pain


Status: Acute  








Plan


Plan of Care


MPI stress test today





Comment


Review of Relevant


I have reviewed the following items antonio (where applicable) has been applied.


Labs





Laboratory Tests








Test


  3/11/17


17:10 3/11/17


20:01 3/12/17


01:00 3/12/17


05:55


 


White Blood Count


  9.5x10^3/uL


(4.0-11.0) 


  


  


 


 


Red Blood Count


  5.17x10^6/uL


(4.30-5.70) 


  


  


 


 


Hemoglobin


  16.2g/dL


(13.0-17.5) 


  


  


 


 


Hematocrit


  47.7%


(39.0-53.0) 


  


  


 


 


Mean Corpuscular Volume 92fL ()    


 


Mean Corpuscular Hemoglobin 31pg (25-35)    


 


Mean Corpuscular Hemoglobin


Concent 34g/dL (31-37) 


  


  


  


 


 


Red Cell Distribution Width


  12.9%


(11.5-14.5) 


  


  


 


 


Platelet Count


  306x10^3/uL


(140-400) 


  


  


 


 


Neutrophils (%) (Auto) 56% (31-73)    


 


Lymphocytes (%) (Auto) 32% (24-48)    


 


Monocytes (%) (Auto) 7% (0-9)    


 


Eosinophils (%) (Auto) 4% (0-3)    


 


Basophils (%) (Auto) 1% (0-3)    


 


Neutrophils # (Auto)


  5.3x10^3uL


(1.8-7.7) 


  


  


 


 


Lymphocytes # (Auto)


  3.0x10^3/uL


(1.0-4.8) 


  


  


 


 


Monocytes # (Auto)


  0.7x10^3/uL


(0.0-1.1) 


  


  


 


 


Eosinophils # (Auto)


  0.4x10^3/uL


(0.0-0.7) 


  


  


 


 


Basophils # (Auto)


  0.1x10^3/uL


(0.0-0.2) 


  


  


 


 


Prothrombin Time


  12.0SEC


(11.7-14.0) 


  


  


 


 


Prothromb Time International


Ratio 0.9 (0.8-1.1) 


  


  


  


 


 


Sodium Level


  138mmol/L


(136-145) 


  


  


 


 


Potassium Level


  3.9mmol/L


(3.5-5.1) 


  


  


 


 


Chloride Level


  100mmol/L


() 


  


  


 


 


Carbon Dioxide Level


  24mmol/L


(21-32) 


  


  


 


 


Anion Gap 14 (6-14)    


 


Blood Urea Nitrogen 9mg/dL (8-26)    


 


Creatinine


  1.3mg/dL


(0.7-1.3) 


  


  


 


 


Estimated GFR


(Cockcroft-Gault) 53.8 


  


  


  


 


 


Glucose Level


  140mg/dL


(70-99) 


  


  


 


 


Calcium Level


  9.3mg/dL


(8.5-10.1) 


  


  


 


 


Magnesium Level


  1.9mg/dL


(1.8-2.4) 


  


  


 


 


Total Bilirubin


  0.6mg/dL


(0.2-1.0) 


  


  


 


 


Direct Bilirubin


  0.1mg/dL


(0.0-0.2) 


  


  


 


 


Aspartate Amino Transf


(AST/SGOT) 29U/L (15-37) 


  


  


  


 


 


Alanine Aminotransferase


(ALT/SGPT) 31U/L (16-63) 


  


  


  


 


 


Alkaline Phosphatase 47U/L ()    


 


Creatine Kinase


  125U/L


() 


  


  


 


 


Creatine Kinase MB (Mass)


  1.6ng/mL


(0.0-3.6) 


  


  


 


 


Creatine Kinase MB Relative


Index 1.3% (0-4) 


  


  


  


 


 


Troponin I Quantitative


  < 0.017ng/mL


(0.000-0.055) 


  < 0.017ng/mL


(0.000-0.055) < 0.017ng/mL


(0.000-0.055)


 


NT-Pro-B-Type Natriuretic


Peptide 93pg/mL


(0-449) 


  


  


 


 


Total Protein


  8.7g/dL


(6.4-8.2) 


  


  


 


 


Albumin


  4.2g/dL


(3.4-5.0) 


  


  


 


 


Glucose (Fingerstick)


  


  216mg/dL


(70-99) 


  


 


 


Triglycerides Level


  


  


  


  231mg/dL


(0-150)


 


Cholesterol Level


  


  


  


  133mg/dL


(0-200)


 


LDL Cholesterol, Calculated


  


  


  


  56mg/dL


(0-100)


 


VLDL Cholesterol, Calculated    46mg/dL (0-40) 


 


HDL Cholesterol


  


  


  


  31mg/dL


(40-60)


 


Cholesterol/HDL Ratio    4.3 














Test


  3/12/17


07:15 3/12/17


11:25 3/12/17


16:22 3/12/17


20:33


 


Glucose (Fingerstick)


  153mg/dL


(70-99) 182mg/dL


(70-99) 138mg/dL


(70-99) 172mg/dL


(70-99)














Test


  3/13/17


04:00 3/13/17


08:17 


  


 


 


White Blood Count


  8.4x10^3/uL


(4.0-11.0) 


  


  


 


 


Red Blood Count


  4.61x10^6/uL


(4.30-5.70) 


  


  


 


 


Hemoglobin


  14.3g/dL


(13.0-17.5) 


  


  


 


 


Hematocrit


  42.3%


(39.0-53.0) 


  


  


 


 


Mean Corpuscular Volume 92fL ()    


 


Mean Corpuscular Hemoglobin 31pg (25-35)    


 


Mean Corpuscular Hemoglobin


Concent 34g/dL (31-37) 


  


  


  


 


 


Red Cell Distribution Width


  12.8%


(11.5-14.5) 


  


  


 


 


Platelet Count


  240x10^3/uL


(140-400) 


  


  


 


 


Neutrophils (%) (Auto) 53% (31-73)    


 


Lymphocytes (%) (Auto) 30% (24-48)    


 


Monocytes (%) (Auto) 9% (0-9)    


 


Eosinophils (%) (Auto) 6% (0-3)    


 


Basophils (%) (Auto) 2% (0-3)    


 


Neutrophils # (Auto)


  4.4x10^3uL


(1.8-7.7) 


  


  


 


 


Lymphocytes # (Auto)


  2.6x10^3/uL


(1.0-4.8) 


  


  


 


 


Monocytes # (Auto)


  0.8x10^3/uL


(0.0-1.1) 


  


  


 


 


Eosinophils # (Auto)


  0.5x10^3/uL


(0.0-0.7) 


  


  


 


 


Basophils # (Auto)


  0.1x10^3/uL


(0.0-0.2) 


  


  


 


 


Sodium Level


  142mmol/L


(136-145) 


  


  


 


 


Potassium Level


  4.5mmol/L


(3.5-5.1) 


  


  


 


 


Chloride Level


  107mmol/L


() 


  


  


 


 


Carbon Dioxide Level


  27mmol/L


(21-32) 


  


  


 


 


Anion Gap 8 (6-14)    


 


Blood Urea Nitrogen 14mg/dL (8-26)    


 


Creatinine


  1.2mg/dL


(0.7-1.3) 


  


  


 


 


Estimated GFR


(Cockcroft-Gault) 59.0 


  


  


  


 


 


Glucose Level


  120mg/dL


(70-99) 


  


  


 


 


Calcium Level


  8.7mg/dL


(8.5-10.1) 


  


  


 


 


Glucose (Fingerstick)


  


  152mg/dL


(70-99) 


  


 








Laboratory Tests








Test


  3/12/17


11:25 3/12/17


16:22 3/12/17


20:33 3/13/17


04:00


 


Glucose (Fingerstick)


  182mg/dL


(70-99) 138mg/dL


(70-99) 172mg/dL


(70-99) 


 


 


White Blood Count


  


  


  


  8.4x10^3/uL


(4.0-11.0)


 


Red Blood Count


  


  


  


  4.61x10^6/uL


(4.30-5.70)


 


Hemoglobin


  


  


  


  14.3g/dL


(13.0-17.5)


 


Hematocrit


  


  


  


  42.3%


(39.0-53.0)


 


Mean Corpuscular Volume    92fL () 


 


Mean Corpuscular Hemoglobin    31pg (25-35) 


 


Mean Corpuscular Hemoglobin


Concent 


  


  


  34g/dL (31-37) 


 


 


Red Cell Distribution Width


  


  


  


  12.8%


(11.5-14.5)


 


Platelet Count


  


  


  


  240x10^3/uL


(140-400)


 


Neutrophils (%) (Auto)    53% (31-73) 


 


Lymphocytes (%) (Auto)    30% (24-48) 


 


Monocytes (%) (Auto)    9% (0-9) 


 


Eosinophils (%) (Auto)    6% (0-3) 


 


Basophils (%) (Auto)    2% (0-3) 


 


Neutrophils # (Auto)


  


  


  


  4.4x10^3uL


(1.8-7.7)


 


Lymphocytes # (Auto)


  


  


  


  2.6x10^3/uL


(1.0-4.8)


 


Monocytes # (Auto)


  


  


  


  0.8x10^3/uL


(0.0-1.1)


 


Eosinophils # (Auto)


  


  


  


  0.5x10^3/uL


(0.0-0.7)


 


Basophils # (Auto)


  


  


  


  0.1x10^3/uL


(0.0-0.2)


 


Sodium Level


  


  


  


  142mmol/L


(136-145)


 


Potassium Level


  


  


  


  4.5mmol/L


(3.5-5.1)


 


Chloride Level


  


  


  


  107mmol/L


()


 


Carbon Dioxide Level


  


  


  


  27mmol/L


(21-32)


 


Anion Gap    8 (6-14) 


 


Blood Urea Nitrogen    14mg/dL (8-26) 


 


Creatinine


  


  


  


  1.2mg/dL


(0.7-1.3)


 


Estimated GFR


(Cockcroft-Gault) 


  


  


  59.0 


 


 


Glucose Level


  


  


  


  120mg/dL


(70-99)


 


Calcium Level


  


  


  


  8.7mg/dL


(8.5-10.1)














Test


  3/13/17


08:17 


  


  


 


 


Glucose (Fingerstick)


  152mg/dL


(70-99) 


  


  


 








Medications





 Current Medications


Nitroglycerin (Nitrostat) 0.4 mg PRN Q5MIN  PRN SL CP RATING > 1/10;  Start 3/11

/17 at 17:15;  Stop 3/11/17 at 18:58;  Status DC


Nitroglycerin (Nitrostat) 0.4 mg PRN Q5MIN  PRN SL CHEST PAIN;  Start 3/11/17 

at 19:00;  Stop 3/11/17 at 19:22;  Status DC


Amlodipine Besylate (Norvasc) 5 mg DAILY PO  Last administered on 3/12/17at 08:

26;  Start 3/12/17 at 09:00


Lisinopril (Prinivil) 20 mg DAILY PO  Last administered on 3/12/17at 08:27;  

Start 3/12/17 at 09:00


Insulin Aspart (Novolog) 6 units DAILYWBKFT SQ  Last administered on 3/12/17at 

08:31;  Start 3/12/17 at 08:00


Insulin Aspart (Novolog) 4 units DAILYWLUN SQ  Last administered on 3/12/17at 12

:17;  Start 3/12/17 at 12:00


Insulin Aspart (Novolog) 4 units DAILYWSUP SQ  Last administered on 3/12/17at 17

:40;  Start 3/12/17 at 17:00


Levothyroxine Sodium (Synthroid) 88 mcg DAILY07 PO  Last administered on 3/13/

17at 06:28;  Start 3/12/17 at 07:00


Insulin Detemir (Levemir) 22 units QHS SQ  Last administered on 3/12/17at 20:44

;  Start 3/11/17 at 21:00


Metformin HCl (Glucophage) 1,000 mg BIDWMEALS PO  Last administered on 3/12/

17at 17:37;  Start 3/12/17 at 08:00


Atorvastatin Calcium (Lipitor) 20 mg QHS PO  Last administered on 3/12/17at 20:

40;  Start 3/11/17 at 21:00


Acetaminophen (Tylenol) 650 mg PRN Q4HRS  PRN PO MILD PAIN / TEMP;  Start 3/11/

17 at 19:15


Magnesium Hydroxide (Milk Of Magnesia) 2,400 mg DAILY  PRN PO CONSTIPATION;  

Start 3/11/17 at 19:15


Nitroglycerin (Nitrostat) 0.4 mg PRN Q5MIN  PRN SL CHEST PAIN;  Start 3/11/17 

at 19:15


Aspirin (Edie Aspirin) 325 mg DAILYWBKFT PO  Last administered on 3/12/17at 12:

15;  Start 3/12/17 at 11:00





Active Scripts


Active


Amlodipine-Benazepril 5-10 Mg (Amlodipine Besylate/Benazepril) 1 Each Capsule 1 

Cap PO DAILY 


Novolog Flexpen (Insulin Aspart) 100 Unit/1 Ml Insuln.pen 4 Units SQ TIDAC 


Lantus Solostar (Insulin Glargine,Hum.rec.anlog) 100 Unit/1 Ml Insuln.pen 20 

Unit SQ QHS 


Lovastatin 40 Mg Tablet 40 Mg PO HS 


Metformin Hcl 1,000 Mg Tablet 1 Tab PO BID 


Levothyroxine Sodium 88 Mcg Tablet 1 Tab PO DAILY


Vitals/I & O





 Vital Sign - Last 24 Hours








 3/12/17 3/12/17 3/12/17 3/12/17





 10:35 14:30 19:45 23:01


 


Temp 97.5 97.9 97.8 97.7





 97.5 97.9 97.8 97.7


 


Pulse 71 76 80 73


 


Resp 18 18 20 20


 


B/P 127/76 125/77 134/77 138/80


 


Pulse Ox 97 97 92 95


 


O2 Delivery Room Air Room Air Room Air Room Air


 


    





    





 3/13/17 3/13/17  





 03:07 07:00  


 


Temp  98.1  





  98.1  


 


Pulse 68 72  


 


Resp  14  


 


B/P  131/80  


 


Pulse Ox  97  


 


O2 Delivery  Room Air  














 Intake and Output   


 


 3/12/17 3/12/17 3/13/17





 15:00 23:00 07:00


 


Intake Total 480 ml 500 ml 


 


Balance 480 ml 500 ml 














MARLA CANTU MD Mar 13, 2017 08:37

## 2017-03-13 NOTE — PDOC
LEESA DAVILA APRN 3/13/17 1441:


CARDIO Progress Notes


Date and Time


Date of Service


3/13/17


Time of Evaluation


1245





Subjective


Subjective:  No Chest Pain, No shortness of breath, No Palpitations





Vitals


Vitals





 Vital Signs








  Date Time  Temp Pulse Resp B/P Pulse Ox O2 Delivery O2 Flow Rate FiO2


 


3/13/17 12:19  72  131/80    


 


3/13/17 08:00      Room Air  


 


3/13/17 07:00 98.1  14  97   





 98.1       








Weight


Weight [ ]





Input and Output


Intake and Output











 Intake and Output 


 


 3/13/17





 07:00


 


Intake Total 980 ml


 


Balance 980 ml


 


 


 


Intake Oral 980 ml


 


# Voids 6











Laboratory


Labs





Laboratory Tests








Test


  3/12/17


16:22 3/12/17


20:33 3/13/17


04:00 3/13/17


08:17


 


Glucose (Fingerstick)


  138mg/dL


(70-99) 172mg/dL


(70-99) 


  152mg/dL


(70-99)


 


White Blood Count


  


  


  8.4x10^3/uL


(4.0-11.0) 


 


 


Red Blood Count


  


  


  4.61x10^6/uL


(4.30-5.70) 


 


 


Hemoglobin


  


  


  14.3g/dL


(13.0-17.5) 


 


 


Hematocrit


  


  


  42.3%


(39.0-53.0) 


 


 


Mean Corpuscular Volume   92fL ()  


 


Mean Corpuscular Hemoglobin   31pg (25-35)  


 


Mean Corpuscular Hemoglobin


Concent 


  


  34g/dL (31-37) 


  


 


 


Red Cell Distribution Width


  


  


  12.8%


(11.5-14.5) 


 


 


Platelet Count


  


  


  240x10^3/uL


(140-400) 


 


 


Neutrophils (%) (Auto)   53% (31-73)  


 


Lymphocytes (%) (Auto)   30% (24-48)  


 


Monocytes (%) (Auto)   9% (0-9)  


 


Eosinophils (%) (Auto)   6% (0-3)  


 


Basophils (%) (Auto)   2% (0-3)  


 


Neutrophils # (Auto)


  


  


  4.4x10^3uL


(1.8-7.7) 


 


 


Lymphocytes # (Auto)


  


  


  2.6x10^3/uL


(1.0-4.8) 


 


 


Monocytes # (Auto)


  


  


  0.8x10^3/uL


(0.0-1.1) 


 


 


Eosinophils # (Auto)


  


  


  0.5x10^3/uL


(0.0-0.7) 


 


 


Basophils # (Auto)


  


  


  0.1x10^3/uL


(0.0-0.2) 


 


 


Sodium Level


  


  


  142mmol/L


(136-145) 


 


 


Potassium Level


  


  


  4.5mmol/L


(3.5-5.1) 


 


 


Chloride Level


  


  


  107mmol/L


() 


 


 


Carbon Dioxide Level


  


  


  27mmol/L


(21-32) 


 


 


Anion Gap   8 (6-14)  


 


Blood Urea Nitrogen   14mg/dL (8-26)  


 


Creatinine


  


  


  1.2mg/dL


(0.7-1.3) 


 


 


Estimated GFR


(Cockcroft-Gault) 


  


  59.0 


  


 


 


Glucose Level


  


  


  120mg/dL


(70-99) 


 


 


Calcium Level


  


  


  8.7mg/dL


(8.5-10.1) 


 














Test


  3/13/17


12:10 


  


  


 


 


Glucose (Fingerstick)


  162mg/dL


(70-99) 


  


  


 











Physical Exam


HEENT:  Neck Supple W Full Motion


Chest:  Symmetric


LUNGS:  Clear to Auscultation


Heart:  S1S2, RRR


Abdomen:  Soft N/T


Extremities:  No Edema, No Calf Tenderness


Neurology:  alert, oriented, follow commands





Assessment


Assessment


1.  Chest pain, atypical


2.  Hypertension


3.  Hyperlipidemia


4.  Diabetes


5.  GERD














Recommendations





Echo with normal LV function


MPI testing underway today. If negative, may discharge from a CV standpoint.





HAILEY ORR MD 3/13/17 [id number]:


CARDIO Progress Notes


Plan


Plan


Patient seen and examined. Agree with above nurse practitioner noted.


No acute events overnight.


No recurrent chest pain.


Normal cardiac exam.


MPI and echocardiogram within normal limits.


Okay to discharge from a cardiac perspective. Thank you for this consultation.








LEESA DAVILA Mar 13, 2017 14:41


HAILEY ORR MD Mar 13, 2017 18:25

## 2017-03-13 NOTE — DS
DATE OF DISCHARGE:  03/13/2017



CONSULTANTS:  Dr. Reyez.



FINAL DIAGNOSES:

1.  Chest pressure of undetermined etiology, possibly secondary to esophageal

spasm.

2.  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

3.  Chronic kidney disease stage 3.

4.  Hypertension.

5.  Hyperlipidemia.

6.  Hypothyroidism.

7.  Diverticulosis.



HOSPITAL COURSE:  The patient is a 77-year-old white male with history of

diabetes mellitus type 2, hypertension, hyperlipidemia and hypothyroidism,

admitted to [facility] through Emergency Room with chest

pressure.  States he was sitting down at the computer, reviewing emails when he

developed the onset of chest pressure associated with dizziness and generalized

weakness, but no shortness of breath or diaphoresis.  He took an aspirin 81 mg

____ and the paramedics arrived and gave him sublingual nitroglycerin, on the

way his chest pressure eventually resolved, but was resolving before the

nitroglycerin.  An EKG showed no acute changes.  Chest x-ray showed no acute

abnormality.  He was subsequently admitted to the hospital.  Troponin level was

negative x 3.  Echocardiogram showed a preserved left ventricular ejection

fraction and his myocardial perfusion imaging stress test was negative for

reversible ischemia.  Seen in consultation by Dr. Reyez from Cardiology. 

Initially he was started on aspirin, but this will be discontinued as he has a

history of diverticular bleed several years ago.  He will be dismissed to home

on Levemir insulin 22 units at bedtime, NovoLog 6 units before breakfast and 4

units before lunch and supper, amlodipine/benazepril 5/10 mg once a day and also

another benazepril 10 mg every day, levothyroxine 88 mcg every day, metformin

1000 mg b.i.d., and lovastatin 40 mg every day.  He was told to make an

appointment to see Dr. Edgar in the office next week.  He will discontinue the

aspirin that was started in the hospital.

 



______________________________

MARLA EDGAR MD DR:  MELISSA/richelle  JOB#:  [id number] / [id number]

DD:  03/13/2017 15:21  DT:  03/13/2017 22:58

## 2017-03-13 NOTE — DISCH
DISCHARGE INSTRUCTIONS


Condition on Discharge


Condition on Discharge:  Stable





Activity After Discharge


Activity Instructions for Disc:  Resume previous activity





Diet after Discharge


Diet after Discharge:  Diabetic No Calorie Level





Contacting the DRMeryl after DC


Call your doctor for:  If your condition worsens





Follow-Up


Follow up with:  dr. cantu next week








MARLA CANTU MD Mar 13, 2017 15:23

## 2017-03-13 NOTE — CARD
--------------- APPROVED REPORT --------------





EXAM: Two-dimensional and M-mode echocardiogram with Doppler and color Doppler.



Other Information 

Quality : Average

Rhythm : NSR



INDICATION

Chest Pain 



2D DIMENSIONS 

RVDd2.9 (2.9-3.5cm)Left Atrium(2D)3.3 (1.6-4.0cm)

IVSd0.8 (0.7-1.1cm)Aortic Root(2D)3.0 (2.0-3.7cm)

LVDd4.4 (3.9-5.9cm)LVOT Diameter2.2 (1.8-2.4cm)

PWd0.7 (0.7-1.1cm)LVDs2.7 (2.5-4.0cm)

FS (%) 30.6 %SV61.4 ml

LVEF(%)60.1 (>50%)



Aortic Valve

AoV Peak Silver.125.5cm/sAoV VTI21.2cm

AO Peak GR.6.3mmHgLVOT Peak Silver.73.8cm/s

LVOT  VTI 15.68cmAO Mean GR.4mmHg

WENDY (VMAX)2.30ee5AAM   (VTI)2.72cm2

AI P 1/2 [id number]



Mitral Valve

MV E Ctldyvxd74.1cm/sMV DECEL [id number]

MV A Othzanbp59.5cm/sMV E Mean Gr.1mmHg

MV [id number]/A  Ratio0.7

MV A [id number] (PHT)4.21cm2



TDI

E/Lateral E'7.6E/Medial E'5.4



Pulmonary Valve

PV Peak Mrnjqihb85.0cm/sPV Peak Grad.3mmHg

RVOT VTI11.5cm



Tricuspid Valve

TR P. Aaxxupsy385mr/sRAP ONPCMGFO4ttQv

TR Peak Gr.22iwEaVYRB60ddKh



Pulmonary Vein

S1 Jmtxgdft59.7cm/sD2 Ahjcwzvs53.6cm/s



 LEFT VENTRICLE 

The left ventricle is normal size. There is normal left ventricular wall thickness. Left ventricle sy
stolic function is normal. The Ejection Fraction is 55-60%. There is normal LV segmental wall motion.
 Tissue Doppler imaging reveals mild left ventricular diastolic dysfunction. 



 RIGHT VENTRICLE 

The right ventricle is normal size. The right ventricular systolic function is normal.



 ATRIA 

The left atrium size is normal. The right atrium size is normal. The interatrial septum is intact wit
h no evidence for an atrial septal defect or patent foramen ovale as noted on 2-D or Doppler imaging.




 AORTIC VALVE 

The aortic valve is normal in structure and function. The aortic valve is trileaflet. Doppler and Col
or Flow revealed no significant aortic regurgitation. There is no significant aortic valvular stenosi
s.



 MITRAL VALVE 

The mitral valve is normal in structure and function. There is no mitral valve stenosis. Doppler and 
Color Flow revealed no mitral valve regurgitation noted.



 TRICUSPID VALVE 

The tricuspid valve is normal in structure and function. Doppler and Color Flow revealed mild tricusp
id regurgitation. The PA pressure was estimated at 35 mmHg. There is no tricuspid valve stenosis.



 PULMONIC VALVE 

The pulmonic valve is not well visualized. Doppler and Color Flow revealed no pulmonic valvular regur
gitation. There is no pulmonic valvular stenosis.



 GREAT VESSELS 

The aortic root is normal in size. Normal pulmonary venous flow (Doppler). The IVC was not visualized
.



 PERICARDIAL EFFUSION 

There is no evidence of significant pericardial effusion.



Critical Notification

Critical Value: No



<Conclusion>

Left ventricle systolic function is normal. The Ejection Fraction is 55-60%.

There is normal LV segmental wall motion.

No significant valvular disease.

## 2017-03-13 NOTE — RAD
--------------- APPROVED REPORT --------------





Test Type:          Exercise

Stress Nurse/Tech: Selina Nielsen R.N.

Test Indications: chest pain

Cardiac History: Family history, Hypertension, Diabetes

Medications:     See Electronic Medical Record

Medical History: See Electronic Medical Record

Resting ECG:     NSR

Resting Heart Rate: 100 bpm

Resting Blood Pressure: 150/80mmHg

Pretest Chest Pain: No chest pain



Nurse/Tech Notes

S1S2, lungs sound clear

Consent: The procedure was explained to the patient in lay terms. Informed consent was witnessed. Rakesh
eout was entered into [facility]. History and Stress Test performed by Selina Nielsen R.N.



Stress Symptoms

Dyspnea



POST EXERCISE

Reason for Termination: Reached target heart rate

Target HR: 123

Max HR: 155 bpm

Exercise duration: 6 min min:sec, 2 Stage

Max Blood Pressure: 176/80mmHg

Blood Pressure response to exercise: Normal blood pressure response during stress.

Chest Pain: No. 

Arrhythmia: Yes. occ  PVC

ST Change: No. 



INTERPRETATION

Stress EKG Conclusion: Stress EKG has artifact but no gross abnormalities noted. 



The rest and stress images show normal perfusion, normal contraction and thickening.



Other Information

Quality:Average

Risk Assessment: Low Risk



Conclusion

1. Baseline EKG unremarkable, stress EKG with artifact but no gross abnormalities seen. Average work 
load at 7.0 Mets.

2. Perfusion imaging shows fixed basal lateral defect likely due to motion but no significant ischemi
a noted.

3. Normal EF at > 70%

4. Low risk study

## 2017-03-13 NOTE — PDOC
Provider Note


Provider Note


discharge summary dictated  #  [id number]








MARLA CANTU MD Mar 13, 2017 15:22

## 2019-11-05 ENCOUNTER — HOSPITAL ENCOUNTER (OUTPATIENT)
Dept: HOSPITAL 61 - RAD | Age: 79
Discharge: HOME | End: 2019-11-05
Attending: INTERNAL MEDICINE
Payer: MEDICARE

## 2019-11-05 DIAGNOSIS — M25.862: Primary | ICD-10-CM

## 2019-11-05 PROCEDURE — 73562 X-RAY EXAM OF KNEE 3: CPT

## 2019-11-05 NOTE — RAD
EXAM: Left knee, 3 views.

 

HISTORY: Pain.

 

COMPARISON: None.

 

FINDINGS: 3 views left knee are obtained. There is no fracture, 

dislocation or subluxation. There is minimal joint fluid. There are 

vascular calcifications.

 

IMPRESSION: No acute osseous finding.

 

Electronically signed by: Miroslava Gutierrez MD (11/5/2019 4:50 PM) [facility]